# Patient Record
Sex: FEMALE | Race: WHITE
[De-identification: names, ages, dates, MRNs, and addresses within clinical notes are randomized per-mention and may not be internally consistent; named-entity substitution may affect disease eponyms.]

---

## 2023-03-31 ENCOUNTER — APPOINTMENT (OUTPATIENT)
Dept: ORTHOPEDIC SURGERY | Facility: CLINIC | Age: 58
End: 2023-03-31
Payer: COMMERCIAL

## 2023-03-31 VITALS — WEIGHT: 200 LBS | HEIGHT: 65 IN | BODY MASS INDEX: 33.32 KG/M2

## 2023-03-31 DIAGNOSIS — M51.36 OTHER INTERVERTEBRAL DISC DEGENERATION, LUMBAR REGION: ICD-10-CM

## 2023-03-31 DIAGNOSIS — M54.16 RADICULOPATHY, LUMBAR REGION: ICD-10-CM

## 2023-03-31 DIAGNOSIS — M43.16 SPONDYLOLISTHESIS, LUMBAR REGION: ICD-10-CM

## 2023-03-31 PROCEDURE — 72100 X-RAY EXAM L-S SPINE 2/3 VWS: CPT

## 2023-03-31 PROCEDURE — 72170 X-RAY EXAM OF PELVIS: CPT

## 2023-03-31 PROCEDURE — 99213 OFFICE O/P EST LOW 20 MIN: CPT

## 2023-03-31 RX ORDER — MELOXICAM 15 MG/1
15 TABLET ORAL
Qty: 30 | Refills: 0 | Status: ACTIVE | COMMUNITY
Start: 2023-03-31 | End: 1900-01-01

## 2023-03-31 RX ORDER — METHOCARBAMOL 750 MG/1
750 TABLET, FILM COATED ORAL TWICE DAILY
Qty: 60 | Refills: 0 | Status: ACTIVE | COMMUNITY
Start: 2023-03-31 | End: 1900-01-01

## 2023-03-31 RX ORDER — METHYLPREDNISOLONE 4 MG/1
4 TABLET ORAL
Qty: 1 | Refills: 0 | Status: ACTIVE | COMMUNITY
Start: 2023-03-31 | End: 1900-01-01

## 2023-03-31 NOTE — HISTORY OF PRESENT ILLNESS
[8] : 8 [4] : 4 [Dull/Aching] : dull/aching [Constant] : constant [Nothing helps with pain getting better] : Nothing helps with pain getting better [de-identified] : 3-31-23- 4 + week history of left sided lower back pain with radiation into the left groin and down the posterior left leg. denies injury. states symptoms were exacerbated this past weekend. she took some aleve and has had some improvement. she has been ambulating with a limp \par \par works at [employer] as can  [] : no [FreeTextEntry1] : LT Hip [FreeTextEntry5] : LT Hip pain started a month ago. Pt denies injury. Pt has pain shooting down the back into the hip and down the leg into the foot. Pt has N/T in her LT foot

## 2023-03-31 NOTE — ASSESSMENT
[FreeTextEntry1] : will start her on medrol and robaxin and therapy followed by mobic\par f/u 6 weeks

## 2023-03-31 NOTE — PHYSICAL EXAM
[Flexion] : flexion [Extension] : extension [Bending to left] : bending to left [Bending to right] : bending to right [] : non-antalgic [Disc space narrowing] : Disc space narrowing [Spondylolithesis] : Spondylolithesis [AP] : anteroposterior [de-identified] : large calcified fibroid

## 2023-05-12 ENCOUNTER — APPOINTMENT (OUTPATIENT)
Dept: ORTHOPEDIC SURGERY | Facility: CLINIC | Age: 58
End: 2023-05-12

## 2023-07-25 ENCOUNTER — APPOINTMENT (OUTPATIENT)
Dept: OBGYN | Facility: CLINIC | Age: 58
End: 2023-07-25

## 2023-08-15 ENCOUNTER — NON-APPOINTMENT (OUTPATIENT)
Age: 58
End: 2023-08-15

## 2023-08-16 ENCOUNTER — APPOINTMENT (OUTPATIENT)
Dept: OBGYN | Facility: CLINIC | Age: 58
End: 2023-08-16
Payer: COMMERCIAL

## 2023-08-16 VITALS
SYSTOLIC BLOOD PRESSURE: 120 MMHG | HEART RATE: 83 BPM | WEIGHT: 212 LBS | HEIGHT: 65 IN | DIASTOLIC BLOOD PRESSURE: 75 MMHG | OXYGEN SATURATION: 98 % | BODY MASS INDEX: 35.32 KG/M2

## 2023-08-16 DIAGNOSIS — Z00.00 ENCOUNTER FOR GENERAL ADULT MEDICAL EXAMINATION W/OUT ABNORMAL FINDINGS: ICD-10-CM

## 2023-08-16 PROCEDURE — 99386 PREV VISIT NEW AGE 40-64: CPT

## 2023-08-16 NOTE — HISTORY OF PRESENT ILLNESS
[FreeTextEntry1] : HPI: Patient is a 56yo female presenting for her well woman exam.  Patient is without complaints today.  ROS: neg unless specified in HPI  OB Hx: G0  Gyn Hx:  Menopause:No HRT,  No PMB Known large fibroid causing pressure pain, unsure if growing Pap: none in EMR Breast: none in EMR  PMH: DM PSH: denies Meds: none All: Oxy FH: Denies any breast, gynecologic, or GI cancers in the family SH: neg x 3  Additional Exam:   Gyn: Normal appearing external genitalia, normal appearing vagina and cervix, no CMT, bimanual with enlarged mobile uterus, no fixed adnexal masses or tenderness Bimanual limited by body habitus  Breast: No lymphadenopathy in the neck, chest wall, bilateral supraclavicular, infraclavicular, and bilateral axillary areas.  No overt asymmetry in bilateral breast contour with normal appearing skin and normal appearing nipple areolar complex bilaterally.  No nipple discharge expressed.  A/P: 56yo female here for annual exam Gyn Screening: - Pap: cytology + automatic HR HPV sent - Fibroids: US ordered, surgical options discussed - STI screening:GC/CT/Trich sent  Breast Screening:  - Discussed self-breast exam - Clinical breast exam performed - Mammogram for this year  AHM: CV, Pulmonary, Endocrine, GI, Bone Screening, Vitamin supplementation, and Immunizations with PCP  RTO 1 year for annual well woman exam KAIN Wood MD

## 2023-09-07 LAB
C TRACH RRNA SPEC QL NAA+PROBE: NOT DETECTED
CYTOLOGY CVX/VAG DOC THIN PREP: NORMAL
HPV HIGH+LOW RISK DNA PNL CVX: NOT DETECTED
N GONORRHOEA RRNA SPEC QL NAA+PROBE: NOT DETECTED
SOURCE TP AMPLIFICATION: NORMAL
T VAGINALIS RRNA SPEC QL NAA+PROBE: NOT DETECTED

## 2023-09-12 ENCOUNTER — APPOINTMENT (OUTPATIENT)
Dept: OBGYN | Facility: CLINIC | Age: 58
End: 2023-09-12
Payer: COMMERCIAL

## 2023-09-12 VITALS
HEIGHT: 65 IN | WEIGHT: 214 LBS | DIASTOLIC BLOOD PRESSURE: 67 MMHG | HEART RATE: 79 BPM | TEMPERATURE: 98.6 F | OXYGEN SATURATION: 98 % | SYSTOLIC BLOOD PRESSURE: 124 MMHG | BODY MASS INDEX: 35.65 KG/M2

## 2023-09-12 PROCEDURE — 99214 OFFICE O/P EST MOD 30 MIN: CPT

## 2023-09-13 ENCOUNTER — NON-APPOINTMENT (OUTPATIENT)
Age: 58
End: 2023-09-13

## 2023-10-06 ENCOUNTER — APPOINTMENT (OUTPATIENT)
Dept: DERMATOLOGY | Facility: CLINIC | Age: 58
End: 2023-10-06
Payer: COMMERCIAL

## 2023-10-06 DIAGNOSIS — E70.29 OTHER DISORDERS OF TYROSINE METABOLISM: ICD-10-CM

## 2023-10-06 DIAGNOSIS — L81.1 CHLOASMA: ICD-10-CM

## 2023-10-06 DIAGNOSIS — L81.8 OTHER SPECIFIED DISORDERS OF PIGMENTATION: ICD-10-CM

## 2023-10-06 PROCEDURE — 99204 OFFICE O/P NEW MOD 45 MIN: CPT

## 2024-04-17 ENCOUNTER — APPOINTMENT (OUTPATIENT)
Dept: ORTHOPEDIC SURGERY | Facility: CLINIC | Age: 59
End: 2024-04-17
Payer: COMMERCIAL

## 2024-04-17 VITALS — HEIGHT: 65 IN | WEIGHT: 214 LBS | BODY MASS INDEX: 35.65 KG/M2

## 2024-04-17 DIAGNOSIS — S93.492A SPRAIN OF OTHER LIGAMENT OF LEFT ANKLE, INITIAL ENCOUNTER: ICD-10-CM

## 2024-04-17 PROCEDURE — 73610 X-RAY EXAM OF ANKLE: CPT | Mod: LT

## 2024-04-17 PROCEDURE — 99203 OFFICE O/P NEW LOW 30 MIN: CPT

## 2024-04-17 RX ORDER — MELOXICAM 15 MG/1
15 TABLET ORAL
Qty: 30 | Refills: 1 | Status: COMPLETED | COMMUNITY
Start: 2024-04-17 | End: 2024-06-16

## 2024-05-15 ENCOUNTER — APPOINTMENT (OUTPATIENT)
Dept: ORTHOPEDIC SURGERY | Facility: CLINIC | Age: 59
End: 2024-05-15

## 2024-07-05 ENCOUNTER — APPOINTMENT (OUTPATIENT)
Dept: DERMATOLOGY | Facility: CLINIC | Age: 59
End: 2024-07-05

## 2025-04-02 ENCOUNTER — OFFICE (OUTPATIENT)
Dept: URBAN - METROPOLITAN AREA CLINIC 109 | Facility: CLINIC | Age: 60
Setting detail: OPHTHALMOLOGY
End: 2025-04-02
Payer: COMMERCIAL

## 2025-04-02 DIAGNOSIS — H16.222: ICD-10-CM

## 2025-04-02 DIAGNOSIS — E11.9: ICD-10-CM

## 2025-04-02 DIAGNOSIS — H16.221: ICD-10-CM

## 2025-04-02 DIAGNOSIS — H16.223: ICD-10-CM

## 2025-04-02 DIAGNOSIS — H02.832: ICD-10-CM

## 2025-04-02 DIAGNOSIS — H02.834: ICD-10-CM

## 2025-04-02 DIAGNOSIS — H11.042: ICD-10-CM

## 2025-04-02 DIAGNOSIS — H25.13: ICD-10-CM

## 2025-04-02 DIAGNOSIS — H35.361: ICD-10-CM

## 2025-04-02 DIAGNOSIS — H02.835: ICD-10-CM

## 2025-04-02 DIAGNOSIS — H02.831: ICD-10-CM

## 2025-04-02 PROCEDURE — 83861 MICROFLUID ANALY TEARS: CPT | Mod: RT | Performed by: OPHTHALMOLOGY

## 2025-04-02 PROCEDURE — 83861 MICROFLUID ANALY TEARS: CPT | Mod: LT | Performed by: OPHTHALMOLOGY

## 2025-04-02 PROCEDURE — 92004 COMPRE OPH EXAM NEW PT 1/>: CPT | Performed by: OPHTHALMOLOGY

## 2025-04-02 ASSESSMENT — REFRACTION_MANIFEST
OS_AXIS: 20
OD_VA1: 20/25-
OS_VA1: 20/30-
OS_VA1: 20/20-
OD_VA1: 20/20-
OD_SPHERE: +1.00
OS_CYLINDER: -0.25
OD_SPHERE: +1.50
OS_SPHERE: +1.50
OS_SPHERE: +1.00
OS_ADD: +2.25
OD_ADD: +2.25

## 2025-04-02 ASSESSMENT — REFRACTION_AUTOREFRACTION
OD_AXIS: 124
OS_AXIS: 54
OS_CYLINDER: -1.00
OD_CYLINDER: -0.25
OD_SPHERE: +1.75
OS_SPHERE: +2.75

## 2025-04-02 ASSESSMENT — CONFRONTATIONAL VISUAL FIELD TEST (CVF)
OS_FINDINGS: FULL
OD_FINDINGS: FULL

## 2025-04-02 ASSESSMENT — TONOMETRY
OS_IOP_MMHG: 20
OD_IOP_MMHG: 20
OS_IOP_MMHG: 18
OD_IOP_MMHG: 17

## 2025-04-02 ASSESSMENT — VISUAL ACUITY
OD_BCVA: 20/20-2
OS_BCVA: 20/20-1

## 2025-04-02 ASSESSMENT — CORNEAL PTERYGIUM: OS_PTERYGIUM: NASAL

## 2025-04-02 ASSESSMENT — REFRACTION_CURRENTRX
OD_SPHERE: +1.25
OS_OVR_VA: 20/
OS_ADD: +1.75
OD_OVR_VA: 20/
OD_OVR_VA: 20/
OD_ADD: +1.75
OS_SPHERE: +1.50
OS_SPHERE: +1.25
OD_SPHERE: +1.50
OS_OVR_VA: 20/